# Patient Record
Sex: FEMALE | NOT HISPANIC OR LATINO | URBAN - METROPOLITAN AREA
[De-identification: names, ages, dates, MRNs, and addresses within clinical notes are randomized per-mention and may not be internally consistent; named-entity substitution may affect disease eponyms.]

---
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
## 2022-04-02 ASSESSMENT — VISUAL ACUITY
OS_CC: 20/20-2
OD_CC: 20/20-2
OS_CC: 20/20-2
OS_SC: J1
OD_SC: J1
OD_CC: 20/20

## 2022-04-02 ASSESSMENT — TONOMETRY
OS_IOP_MMHG: 15
OD_IOP_MMHG: 14
OS_IOP_MMHG: 15
OD_IOP_MMHG: 15

## 2022-07-21 NOTE — PATIENT DISCUSSION
Educated patient how to monitor their peripheral vision and report any changes. Principal Discharge DX:	Vomiting

## 2022-12-27 ENCOUNTER — COMPREHENSIVE EXAM (OUTPATIENT)
Dept: URBAN - METROPOLITAN AREA CLINIC 1 | Facility: CLINIC | Age: 22
End: 2022-12-27

## 2022-12-27 PROCEDURE — 92014 COMPRE OPH EXAM EST PT 1/>: CPT

## 2022-12-27 PROCEDURE — 92015 DETERMINE REFRACTIVE STATE: CPT

## 2022-12-27 ASSESSMENT — KERATOMETRY
OD_AXISANGLE2_DEGREES: 011
OD_K2POWER_DIOPTERS: 46.75
OS_AXISANGLE2_DEGREES: 150
OD_K1POWER_DIOPTERS: 46.25
OS_K1POWER_DIOPTERS: 45.75
OS_K2POWER_DIOPTERS: 46.50
OS_AXISANGLE_DEGREES: 60
OD_AXISANGLE_DEGREES: 101

## 2022-12-27 ASSESSMENT — TONOMETRY
OD_IOP_MMHG: 14
OS_IOP_MMHG: 15

## 2022-12-27 ASSESSMENT — VISUAL ACUITY
OS_SC: 20/30
OD_SC: 20/40 -1
